# Patient Record
Sex: MALE | Race: WHITE | NOT HISPANIC OR LATINO | URBAN - METROPOLITAN AREA
[De-identification: names, ages, dates, MRNs, and addresses within clinical notes are randomized per-mention and may not be internally consistent; named-entity substitution may affect disease eponyms.]

---

## 2022-07-13 ENCOUNTER — WEB ENCOUNTER (OUTPATIENT)
Dept: URBAN - METROPOLITAN AREA CLINIC 19 | Facility: CLINIC | Age: 72
End: 2022-07-13

## 2022-07-18 ENCOUNTER — OFFICE VISIT (OUTPATIENT)
Dept: URBAN - METROPOLITAN AREA CLINIC 19 | Facility: CLINIC | Age: 72
End: 2022-07-18
Payer: MEDICARE

## 2022-07-18 ENCOUNTER — DASHBOARD ENCOUNTERS (OUTPATIENT)
Age: 72
End: 2022-07-18

## 2022-07-18 VITALS
BODY MASS INDEX: 31.39 KG/M2 | SYSTOLIC BLOOD PRESSURE: 120 MMHG | HEIGHT: 74 IN | TEMPERATURE: 97.4 F | WEIGHT: 244.6 LBS | DIASTOLIC BLOOD PRESSURE: 82 MMHG

## 2022-07-18 DIAGNOSIS — A04.8 H. PYLORI INFECTION: ICD-10-CM

## 2022-07-18 DIAGNOSIS — Z80.0 FAMILY HISTORY OF PANCREATIC CANCER: ICD-10-CM

## 2022-07-18 DIAGNOSIS — Z87.19 HISTORY OF BARRETT'S ESOPHAGUS: ICD-10-CM

## 2022-07-18 PROBLEM — 429000004: Status: ACTIVE | Noted: 2022-07-18

## 2022-07-18 PROBLEM — 16093611000119107: Status: ACTIVE | Noted: 2022-07-18

## 2022-07-18 PROCEDURE — 99213 OFFICE O/P EST LOW 20 MIN: CPT | Performed by: NURSE PRACTITIONER

## 2022-07-18 RX ORDER — LOSARTAN POTASSIUM 50 MG/1
1 TABLET TABLET ORAL ONCE A DAY
Status: ACTIVE | COMMUNITY

## 2022-07-18 RX ORDER — METRONIDAZOLE 250 MG/1
1 TABLET TABLET ORAL EVERY 6 HOURS
Qty: 56 TABLET | Refills: 0 | OUTPATIENT
Start: 2022-07-18 | End: 2022-08-01

## 2022-07-18 RX ORDER — DOXYCYCLINE HYCLATE 100 MG/1
1 TABLET CAPSULE, GELATIN COATED ORAL TWICE A DAY
Qty: 28 TABLET | Refills: 0 | OUTPATIENT
Start: 2022-07-18 | End: 2022-08-01

## 2022-07-18 RX ORDER — PANTOPRAZOLE SODIUM 20 MG/1
1 TABLET TABLET, DELAYED RELEASE ORAL TWICE A DAY
Qty: 28 TABLET | Refills: 0 | OUTPATIENT
Start: 2022-07-18

## 2022-07-18 RX ORDER — OMEPRAZOLE 40 MG/1
1 CAPSULE 30 MINUTES BEFORE MORNING MEAL CAPSULE, DELAYED RELEASE ORAL ONCE A DAY
Status: ACTIVE | COMMUNITY

## 2022-07-18 RX ORDER — BISMUTH SUBSALICYLATE 262 MG
2 TABLETS WITH MEALS AND AT BEDTIME TABLET,CHEWABLE ORAL
Qty: 112 TABLET | Refills: 0 | OUTPATIENT
Start: 2022-07-18 | End: 2022-08-01

## 2022-07-18 NOTE — HPI-TODAY'S VISIT:
The patient was referred by Dr. Jaun Crow for abdominal pain.   A copy of this document is being forwarded to the referring provider.  Mr. Carter is a 72-year-old male who presents today for abdominal pain.  Patient reports he was having epigastric/RUQ abdominal pain although he no longer is.  Patient had a positive H. pylori stool test with Dr. Crow in May.  Patient has not had treatment for H. pylori. Last EGD was done at the end of 2020 when he was diagnosed with Chisholm's esophagus.  Patient takes omeprazole 40 mg once a day, which helps with his heartburn.  Patient also does have a family history of pancreatic cancerhis  (brother).  Patient has been having a CT abdomen every year.  Patient was noted to have a cystic lesion on the pancreatic neck in 2019 and 2020.  Last CT scan May 2022 did not show this lesion.  Patient does have 2 cysts on his liver as well.

## 2022-08-01 PROBLEM — 721730009: Status: ACTIVE | Noted: 2022-07-18

## 2022-08-29 ENCOUNTER — OFFICE VISIT (OUTPATIENT)
Dept: URBAN - METROPOLITAN AREA CLINIC 18 | Facility: CLINIC | Age: 72
End: 2022-08-29

## 2022-11-15 LAB
H PYLORI BREATH TEST: NOT DETECTED
H. PYLORI BREATH TEST: NOT DETECTED
INTERPRETATION: NOT DETECTED